# Patient Record
Sex: FEMALE | URBAN - METROPOLITAN AREA
[De-identification: names, ages, dates, MRNs, and addresses within clinical notes are randomized per-mention and may not be internally consistent; named-entity substitution may affect disease eponyms.]

---

## 2020-01-13 ENCOUNTER — OFFICE VISIT (OUTPATIENT)
Dept: URBAN - METROPOLITAN AREA CLINIC 17 | Facility: CLINIC | Age: 25
End: 2020-01-13
Payer: COMMERCIAL

## 2020-01-13 DIAGNOSIS — H53.40 VISUAL FIELD DEFECT: Primary | ICD-10-CM

## 2020-01-13 PROCEDURE — 99204 OFFICE O/P NEW MOD 45 MIN: CPT | Performed by: OPTOMETRIST

## 2020-01-13 PROCEDURE — 92134 CPTRZ OPH DX IMG PST SGM RTA: CPT | Performed by: OPTOMETRIST

## 2020-01-13 PROCEDURE — 92083 EXTENDED VISUAL FIELD XM: CPT | Performed by: OPTOMETRIST

## 2020-01-13 ASSESSMENT — INTRAOCULAR PRESSURE
OS: 11
OD: 9

## 2020-01-13 NOTE — IMPRESSION/PLAN
Impression: Visual field defect: H53.40 OU. VF results:
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 Ordered & reviewed MAC OCT & VF 30-2. Patient was seen in the ER yesterday and had a CT performed, CT results were WNL per pt. Consulted with Dr. Dahlia Fong in clinic. Recommend patient have STAT MRI with & without contrast to rule out a lesion most likely on the visual pathway on the right side of brain. Written order for MRI given to pt. Will follow up when results of MRI are in. UPDATE 1/14/19 2:00 PM: MRI results show active optic neuritis. Called patient and recommended she go to [id number] for IV steroid treatment for optic neuritis today.